# Patient Record
Sex: MALE | Race: OTHER | NOT HISPANIC OR LATINO | URBAN - METROPOLITAN AREA
[De-identification: names, ages, dates, MRNs, and addresses within clinical notes are randomized per-mention and may not be internally consistent; named-entity substitution may affect disease eponyms.]

---

## 2017-02-07 ENCOUNTER — OUTPATIENT (OUTPATIENT)
Dept: OUTPATIENT SERVICES | Facility: HOSPITAL | Age: 72
LOS: 1 days | Discharge: HOME | End: 2017-02-07

## 2017-06-27 DIAGNOSIS — R89.7 ABNORMAL HISTOLOGICAL FINDINGS IN SPECIMENS FROM OTHER ORGANS, SYSTEMS AND TISSUES: ICD-10-CM

## 2017-06-27 PROBLEM — Z00.00 ENCOUNTER FOR PREVENTIVE HEALTH EXAMINATION: Status: ACTIVE | Noted: 2017-06-27

## 2017-08-22 ENCOUNTER — APPOINTMENT (OUTPATIENT)
Dept: UROLOGY | Facility: CLINIC | Age: 72
End: 2017-08-22
Payer: MEDICARE

## 2017-08-22 VITALS — HEART RATE: 65 BPM | SYSTOLIC BLOOD PRESSURE: 134 MMHG | DIASTOLIC BLOOD PRESSURE: 84 MMHG

## 2017-08-22 DIAGNOSIS — Z78.9 OTHER SPECIFIED HEALTH STATUS: ICD-10-CM

## 2017-08-22 DIAGNOSIS — I10 ESSENTIAL (PRIMARY) HYPERTENSION: ICD-10-CM

## 2017-08-22 PROCEDURE — 99213 OFFICE O/P EST LOW 20 MIN: CPT

## 2017-08-22 PROCEDURE — 81002 URINALYSIS NONAUTO W/O SCOPE: CPT

## 2017-08-22 RX ORDER — LISINOPRIL 10 MG/1
10 TABLET ORAL
Refills: 0 | Status: ACTIVE | COMMUNITY

## 2017-08-22 RX ORDER — ATENOLOL 100 MG/1
100 TABLET ORAL
Refills: 0 | Status: ACTIVE | COMMUNITY

## 2018-04-19 ENCOUNTER — APPOINTMENT (OUTPATIENT)
Dept: UROLOGY | Facility: CLINIC | Age: 73
End: 2018-04-19
Payer: MEDICARE

## 2018-04-19 VITALS — DIASTOLIC BLOOD PRESSURE: 81 MMHG | SYSTOLIC BLOOD PRESSURE: 133 MMHG | HEART RATE: 72 BPM

## 2018-04-19 LAB
BILIRUB UR QL STRIP: NORMAL
CLARITY UR: CLEAR
COLLECTION METHOD: NORMAL
GLUCOSE UR-MCNC: NORMAL
HCG UR QL: NORMAL EU/DL
HGB UR QL STRIP.AUTO: NORMAL
KETONES UR-MCNC: NORMAL
LEUKOCYTE ESTERASE UR QL STRIP: 75
NITRITE UR QL STRIP: NORMAL
PH UR STRIP: 5
PROT UR STRIP-MCNC: NORMAL
SP GR UR STRIP: 1.02

## 2018-04-19 PROCEDURE — 81003 URINALYSIS AUTO W/O SCOPE: CPT | Mod: QW

## 2018-04-19 PROCEDURE — 99213 OFFICE O/P EST LOW 20 MIN: CPT

## 2018-07-28 PROBLEM — Z78.9 ALCOHOL USE: Status: ACTIVE | Noted: 2017-08-22

## 2018-11-13 ENCOUNTER — APPOINTMENT (OUTPATIENT)
Dept: UROLOGY | Facility: CLINIC | Age: 73
End: 2018-11-13
Payer: MEDICARE

## 2018-11-13 VITALS
WEIGHT: 157 LBS | HEIGHT: 65 IN | HEART RATE: 72 BPM | SYSTOLIC BLOOD PRESSURE: 117 MMHG | DIASTOLIC BLOOD PRESSURE: 75 MMHG | BODY MASS INDEX: 26.16 KG/M2

## 2018-11-13 LAB
BILIRUB UR QL STRIP: 1
CLARITY UR: CLEAR
COLLECTION METHOD: NORMAL
GLUCOSE UR-MCNC: NORMAL
HCG UR QL: 2 MG/DL
HGB UR QL STRIP.AUTO: NORMAL
KETONES UR-MCNC: NORMAL
LEUKOCYTE ESTERASE UR QL STRIP: NORMAL
NITRITE UR QL STRIP: NORMAL
PH UR STRIP: 5
PROT UR STRIP-MCNC: 100
SP GR UR STRIP: 1.02

## 2018-11-13 PROCEDURE — 81003 URINALYSIS AUTO W/O SCOPE: CPT | Mod: QW

## 2018-11-13 PROCEDURE — 99214 OFFICE O/P EST MOD 30 MIN: CPT

## 2019-05-01 ENCOUNTER — OTHER (OUTPATIENT)
Age: 74
End: 2019-05-01

## 2019-05-02 ENCOUNTER — APPOINTMENT (OUTPATIENT)
Dept: UROLOGY | Facility: CLINIC | Age: 74
End: 2019-05-02
Payer: MEDICARE

## 2019-05-02 VITALS
HEART RATE: 73 BPM | SYSTOLIC BLOOD PRESSURE: 140 MMHG | BODY MASS INDEX: 26.16 KG/M2 | HEIGHT: 65 IN | DIASTOLIC BLOOD PRESSURE: 85 MMHG | WEIGHT: 157 LBS

## 2019-05-02 DIAGNOSIS — N28.89 OTHER SPECIFIED DISORDERS OF KIDNEY AND URETER: ICD-10-CM

## 2019-05-02 LAB
BILIRUB UR QL STRIP: NORMAL
CLARITY UR: CLEAR
COLLECTION METHOD: NORMAL
GLUCOSE UR-MCNC: NORMAL
HCG UR QL: NORMAL EU/DL
HGB UR QL STRIP.AUTO: NORMAL
KETONES UR-MCNC: NORMAL
LEUKOCYTE ESTERASE UR QL STRIP: 25
NITRITE UR QL STRIP: NORMAL
PH UR STRIP: 5
PROT UR STRIP-MCNC: 30
SP GR UR STRIP: 1.01

## 2019-05-02 PROCEDURE — 99214 OFFICE O/P EST MOD 30 MIN: CPT

## 2019-05-02 PROCEDURE — 81003 URINALYSIS AUTO W/O SCOPE: CPT | Mod: QW

## 2019-12-17 ENCOUNTER — APPOINTMENT (OUTPATIENT)
Dept: UROLOGY | Facility: CLINIC | Age: 74
End: 2019-12-17
Payer: MEDICARE

## 2019-12-17 PROCEDURE — 93975 VASCULAR STUDY: CPT

## 2019-12-17 PROCEDURE — 76872 US TRANSRECTAL: CPT

## 2019-12-27 ENCOUNTER — MESSAGE (OUTPATIENT)
Age: 74
End: 2019-12-27

## 2020-09-03 ENCOUNTER — APPOINTMENT (OUTPATIENT)
Dept: UROLOGY | Facility: CLINIC | Age: 75
End: 2020-09-03
Payer: MEDICARE

## 2020-09-03 VITALS — TEMPERATURE: 97.7 F | WEIGHT: 157 LBS | BODY MASS INDEX: 26.16 KG/M2 | HEIGHT: 65 IN

## 2020-09-03 PROCEDURE — 99214 OFFICE O/P EST MOD 30 MIN: CPT

## 2020-09-03 RX ORDER — ALLOPURINOL 100 MG/1
100 TABLET ORAL
Refills: 0 | Status: ACTIVE | COMMUNITY

## 2020-09-03 RX ORDER — TAMSULOSIN HYDROCHLORIDE 0.4 MG/1
0.4 CAPSULE ORAL
Refills: 0 | Status: ACTIVE | COMMUNITY

## 2020-09-03 RX ORDER — FAMOTIDINE 10 MG/1
TABLET, FILM COATED ORAL
Refills: 0 | Status: ACTIVE | COMMUNITY

## 2020-09-22 RX ORDER — FINASTERIDE 5 MG/1
5 TABLET, FILM COATED ORAL DAILY
Qty: 90 | Refills: 3 | Status: ACTIVE | COMMUNITY
Start: 2020-09-22 | End: 1900-01-01

## 2020-12-29 ENCOUNTER — APPOINTMENT (OUTPATIENT)
Dept: UROLOGY | Facility: CLINIC | Age: 75
End: 2020-12-29
Payer: MEDICARE

## 2020-12-29 VITALS
DIASTOLIC BLOOD PRESSURE: 77 MMHG | WEIGHT: 165 LBS | BODY MASS INDEX: 27.49 KG/M2 | HEIGHT: 65 IN | TEMPERATURE: 98.2 F | HEART RATE: 81 BPM | SYSTOLIC BLOOD PRESSURE: 134 MMHG

## 2020-12-29 PROCEDURE — 99213 OFFICE O/P EST LOW 20 MIN: CPT

## 2020-12-29 PROCEDURE — 99072 ADDL SUPL MATRL&STAF TM PHE: CPT

## 2021-03-16 ENCOUNTER — APPOINTMENT (OUTPATIENT)
Dept: UROLOGY | Facility: CLINIC | Age: 76
End: 2021-03-16
Payer: MEDICARE

## 2021-03-16 PROCEDURE — 99448 NTRPROF PH1/NTRNET/EHR 21-30: CPT

## 2021-03-16 RX ORDER — DUTASTERIDE 0.5 MG/1
0.5 CAPSULE, LIQUID FILLED ORAL
Qty: 90 | Refills: 3 | Status: DISCONTINUED | COMMUNITY
Start: 2020-09-03 | End: 2021-03-16

## 2021-03-16 NOTE — HISTORY OF PRESENT ILLNESS
[Home] : at home, [unfilled] , at the time of the visit. [Verbal consent obtained from patient] : the patient, [unfilled] [Nocturia] : nocturia [Straining] : straining [Post-Void Dribbling] : post-void dribbling [None] : None [Other Location: e.g. Home (Enter Location, City,State)___] : at [unfilled] [FreeTextEntry1] : 75 y.o male with h/o BPH(158gms), elevated PSA \par s/p PNBX x 2  last 2/2017\par Now on flomax hs in stead of in am  w/  finasteride  x 3 months \par \par All Past and Present Data Reviewed:\par PSA  2/2021- 6.18  *****  on Finasteride    4K score = 6%\par          8/2020-  12.6     PSAD- 0.07                     TRUS- 12/2019- 158gms, no susp areas\par         12/2019- 13.0                                         abd/pelvic ctscan with/without contrast 5/2019- bilateral renal\par         4/2019  - 12.8     PSAD = .10                                                                             cysts, none   suspicious        psa         11/2018  -12.5     PSAD =  .10 \par         4/2018-  10.03    PSAD  0.08\par         8/2017-  7.0    %free 20\par \par 4 k score - 2016- 14%\par \par lives in [place]\par cannot tolerate MRI due to claustrophobia\par  [Urinary Urgency] : no urinary urgency [Urinary Frequency] : no urinary frequency [Weak Stream] : no weak stream [Dysuria] : no dysuria [Hematuria - Gross] : no gross hematuria [Fever] : no fever

## 2021-03-16 NOTE — ASSESSMENT
[FreeTextEntry1] : 1.  BPH(158gms),\par 2.  elevated PSA  *** on finateride\par      s/p PNBX x 2  last 2/2017\par 3. bilateral  renal cysts, benign\par \par \par -Plan : \par -repeat PSA August -  pt will be in [place].\par -rtn  August

## 2021-03-16 NOTE — PHYSICAL EXAM
[General Appearance - Well Developed] : well developed [General Appearance - Well Nourished] : well nourished [Normal Appearance] : normal appearance [Well Groomed] : well groomed [General Appearance - In No Acute Distress] : no acute distress [Abdomen Soft] : soft [Abdomen Tenderness] : non-tender [Costovertebral Angle Tenderness] : no ~M costovertebral angle tenderness [Skin Color & Pigmentation] : normal skin color and pigmentation [Edema] : no peripheral edema [] : no respiratory distress [Respiration, Rhythm And Depth] : normal respiratory rhythm and effort [Exaggerated Use Of Accessory Muscles For Inspiration] : no accessory muscle use [Oriented To Time, Place, And Person] : oriented to person, place, and time [Affect] : the affect was normal [Mood] : the mood was normal [Not Anxious] : not anxious [Normal Station and Gait] : the gait and station were normal for the patient's age [No Focal Deficits] : no focal deficits [Motor Exam] : the motor exam was normal [No Palpable Adenopathy] : no palpable adenopathy

## 2021-06-21 ENCOUNTER — NON-APPOINTMENT (OUTPATIENT)
Age: 76
End: 2021-06-21

## 2021-08-19 ENCOUNTER — APPOINTMENT (OUTPATIENT)
Dept: UROLOGY | Facility: CLINIC | Age: 76
End: 2021-08-19
Payer: MEDICARE

## 2021-08-19 VITALS — HEIGHT: 65 IN | WEIGHT: 165 LBS | BODY MASS INDEX: 27.49 KG/M2

## 2021-08-19 LAB
BILIRUB UR QL STRIP: NORMAL
COLLECTION METHOD: NORMAL
GLUCOSE UR-MCNC: NORMAL
HCG UR QL: 0.2 EU/DL
HGB UR QL STRIP.AUTO: NORMAL
KETONES UR-MCNC: NORMAL
LEUKOCYTE ESTERASE UR QL STRIP: NORMAL
NITRITE UR QL STRIP: NORMAL
PH UR STRIP: 5.5
PROT UR STRIP-MCNC: 100
SP GR UR STRIP: 1.02

## 2021-08-19 PROCEDURE — 99213 OFFICE O/P EST LOW 20 MIN: CPT

## 2021-08-19 NOTE — HISTORY OF PRESENT ILLNESS
[Nocturia] : nocturia [Straining] : straining [Post-Void Dribbling] : post-void dribbling [None] : None [Home] : at home, [unfilled] , at the time of the visit. [Other Location: e.g. Home (Enter Location, City,State)___] : at [unfilled] [Verbal consent obtained from patient] : the patient, [unfilled] [FreeTextEntry1] : [age] y.o male with h/o BPH(158gms), elevated PSA \par s/p PNBX x 2  last 2/2017\par Now on flomax hs in stead of in am  w/  finasteride  x 3 months \par \par All Past and Present Data Reviewed:\par psa   8/21  =  4.4   % fpsa = 14  ******* finasteride \par PSA  2/2021- 6.18  *****  on Finasteride    4K score = 6%\par          8/2020-  12.6     PSAD- 0.07                     TRUS- 12/2019- 158gms, no susp areas\par         12/2019- 13.0                                         abd/pelvic ctscan with/without contrast 5/2019- bilateral renal\par         4/2019  - 12.8     PSAD = .10                                                                             cysts, none   suspicious                     11/2018  -12.5     PSAD =  .10 \par         4/2018-  10.03    PSAD  0.08\par         8/2017-  7.0    %free 20\par \par 4 k score - 2016- 14%\par \par lives in [place]\par cannot tolerate MRI due to claustrophobia\par  [Urinary Urgency] : no urinary urgency [Urinary Frequency] : no urinary frequency [Weak Stream] : no weak stream [Dysuria] : no dysuria [Hematuria - Gross] : no gross hematuria [Fever] : no fever

## 2021-08-19 NOTE — ASSESSMENT
[FreeTextEntry1] : 1.  BPH(158gms),\par 2.  elevated PSA  *** on finateride\par      s/p PNBX x 2  last 2/2017\par 3. bilateral  renal cysts, benign\par \par \par -Plan : \par -repeat PSA December ==  pt goimg to [place] 2022

## 2021-08-19 NOTE — PHYSICAL EXAM
[General Appearance - Well Developed] : well developed [General Appearance - Well Nourished] : well nourished [Normal Appearance] : normal appearance [Well Groomed] : well groomed [General Appearance - In No Acute Distress] : no acute distress [Abdomen Soft] : soft [Costovertebral Angle Tenderness] : no ~M costovertebral angle tenderness [Abdomen Tenderness] : non-tender [Skin Color & Pigmentation] : normal skin color and pigmentation [Edema] : no peripheral edema [] : no respiratory distress [Respiration, Rhythm And Depth] : normal respiratory rhythm and effort [Exaggerated Use Of Accessory Muscles For Inspiration] : no accessory muscle use [Oriented To Time, Place, And Person] : oriented to person, place, and time [Affect] : the affect was normal [Mood] : the mood was normal [Not Anxious] : not anxious [Normal Station and Gait] : the gait and station were normal for the patient's age [No Focal Deficits] : no focal deficits [Motor Exam] : the motor exam was normal [No Palpable Adenopathy] : no palpable adenopathy

## 2022-03-16 ENCOUNTER — RX RENEWAL (OUTPATIENT)
Age: 77
End: 2022-03-16

## 2022-11-22 ENCOUNTER — APPOINTMENT (OUTPATIENT)
Dept: UROLOGY | Facility: CLINIC | Age: 77
End: 2022-11-22

## 2022-11-22 VITALS
DIASTOLIC BLOOD PRESSURE: 70 MMHG | HEIGHT: 65 IN | BODY MASS INDEX: 25.83 KG/M2 | SYSTOLIC BLOOD PRESSURE: 130 MMHG | WEIGHT: 155 LBS

## 2022-11-22 DIAGNOSIS — N52.9 MALE ERECTILE DYSFUNCTION, UNSPECIFIED: ICD-10-CM

## 2022-11-22 PROCEDURE — 99214 OFFICE O/P EST MOD 30 MIN: CPT

## 2022-11-22 RX ORDER — TADALAFIL 20 MG/1
20 TABLET ORAL
Qty: 30 | Refills: 3 | Status: ACTIVE | COMMUNITY
Start: 2022-11-22 | End: 1900-01-01

## 2023-03-24 ENCOUNTER — RX RENEWAL (OUTPATIENT)
Age: 78
End: 2023-03-24

## 2023-03-24 RX ORDER — FINASTERIDE 5 MG/1
5 TABLET, FILM COATED ORAL
Qty: 90 | Refills: 3 | Status: ACTIVE | COMMUNITY
Start: 2021-03-31 | End: 1900-01-01

## 2023-11-21 ENCOUNTER — APPOINTMENT (OUTPATIENT)
Dept: UROLOGY | Facility: CLINIC | Age: 78
End: 2023-11-21

## 2023-12-13 ENCOUNTER — APPOINTMENT (OUTPATIENT)
Dept: UROLOGY | Facility: CLINIC | Age: 78
End: 2023-12-13
Payer: MEDICARE

## 2023-12-13 PROCEDURE — 99214 OFFICE O/P EST MOD 30 MIN: CPT

## 2023-12-13 NOTE — PHYSICAL EXAM
[General Appearance - Well Developed] : well developed [General Appearance - Well Nourished] : well nourished [Normal Appearance] : normal appearance [Well Groomed] : well groomed [General Appearance - In No Acute Distress] : no acute distress [Abdomen Soft] : soft [Abdomen Tenderness] : non-tender [Costovertebral Angle Tenderness] : no ~M costovertebral angle tenderness [Skin Color & Pigmentation] : normal skin color and pigmentation [] : no respiratory distress [Respiration, Rhythm And Depth] : normal respiratory rhythm and effort [Exaggerated Use Of Accessory Muscles For Inspiration] : no accessory muscle use [Oriented To Time, Place, And Person] : oriented to person, place, and time [Affect] : the affect was normal [Mood] : the mood was normal [Not Anxious] : not anxious [Normal Station and Gait] : the gait and station were normal for the patient's age [No Focal Deficits] : no focal deficits [Motor Exam] : the motor exam was normal

## 2023-12-13 NOTE — ASSESSMENT
[FreeTextEntry1] : 1. BPH(158gms) 2. elevated PSA - s/p PNBX x 2 last 2/2017- decreasing  3. worsening LUTS on finasteride and flomax   Plan: -advise a repeat TRUS to re-size prostate in consideration for a prostate procedure- pt reports he is leaving January 2024 to go to [place] for 6 months -we can switch Flomax to Silodosin 8mg to see if this helps LUTS -continue finasteride and pt advised to be consistent with taking med everyday -repeat PSA in 6 months -rto 6 months -pt advised if LUTS do not improve he should consider seeing a urologist in [place] for management  total time spent with encounter including face to face time with patient and review of chart and plan totaled 30 minutes

## 2023-12-13 NOTE — HISTORY OF PRESENT ILLNESS
[Nocturia] : nocturia [Straining] : straining [Post-Void Dribbling] : post-void dribbling [None] : None [FreeTextEntry1] : 78 year old male with h/o BPH (158 gm), LUTS and elevated PSA  s/p PNBX x 2  last 02/2017.  Patient is maintained on tamsulosin 0.4 MG and Finasteride 5 MG.  He reports worsening LUTS specifically daytime frequently, urgency, nocturia 3-4x, post void dribbling. than he would like, but less than he would prior to the medication.  He denies dysuria, hematuria, fever, nausea, or other constitutional symptoms.   Patient cannot have mpMRI due to claustrophobia Patient now reports having stopped the finasteride for a while when he had his PSA drawn last year  All past and present data reviewed: 11/2023  PSA=  2.3  %free 17   ***on finasteride 11/2022 PSA= 3.8  %free= 21 *** finasteride *** pt now reports he wasn't taking the finasteride 08/2022 PSA= 4.4  %free= 20 *** finasteride 02/2022 PSA= 3.2  *** on finasteride  08/2021 PSA= 4.4   % fpsa = 14  ******* finasteride  02/2021 PSA= 6.18  *****  on Finasteride    4K score = 6% 08/2020 PSA= 12.6     PSAD- 0.07                     TRUS- 12/2019- 158gms, no susp areas 12/2019 PSA= 13.0                                         abd/pelvic ctscan with/without contrast 5/2019- bilateral renal 04/2019 PSA= 12.8     PSAD = .10                                                                             cysts, none   suspicious              11/2018 PSA= 12.5     PSAD =  .10  04/2018 PSA= 10.03    PSAD  0.08 08/2017 PSA= 7.0    %free 20 4K score - 2016- 14% [Urinary Urgency] : no urinary urgency [Urinary Frequency] : no urinary frequency [Weak Stream] : no weak stream [Dysuria] : no dysuria [Hematuria - Gross] : no gross hematuria [Fever] : no fever [Nausea] : no nausea

## 2024-03-12 ENCOUNTER — NON-APPOINTMENT (OUTPATIENT)
Age: 79
End: 2024-03-12

## 2024-03-12 RX ORDER — SILODOSIN 8 MG/1
8 CAPSULE ORAL
Qty: 90 | Refills: 3 | Status: ACTIVE | COMMUNITY
Start: 2023-12-13 | End: 1900-01-01

## 2024-05-07 ENCOUNTER — APPOINTMENT (OUTPATIENT)
Dept: UROLOGY | Facility: CLINIC | Age: 79
End: 2024-05-07
Payer: MEDICARE

## 2024-05-07 VITALS
OXYGEN SATURATION: 98 % | SYSTOLIC BLOOD PRESSURE: 128 MMHG | DIASTOLIC BLOOD PRESSURE: 72 MMHG | TEMPERATURE: 97.9 F | RESPIRATION RATE: 16 BRPM | HEART RATE: 78 BPM

## 2024-05-07 VITALS — WEIGHT: 169 LBS | HEIGHT: 65 IN | BODY MASS INDEX: 28.16 KG/M2

## 2024-05-07 DIAGNOSIS — R35.1 NOCTURIA: ICD-10-CM

## 2024-05-07 LAB
BILIRUB UR QL STRIP: NEGATIVE
GLUCOSE UR-MCNC: NEGATIVE
HCG UR QL: 2 EU/DL
HGB UR QL STRIP.AUTO: NORMAL
KETONES UR-MCNC: NEGATIVE
LEUKOCYTE ESTERASE UR QL STRIP: NEGATIVE
NITRITE UR QL STRIP: NEGATIVE
PH UR STRIP: 6
PROT UR STRIP-MCNC: 30
SP GR UR STRIP: 1.02

## 2024-05-07 PROCEDURE — 51798 US URINE CAPACITY MEASURE: CPT

## 2024-05-07 PROCEDURE — G2211 COMPLEX E/M VISIT ADD ON: CPT

## 2024-05-07 PROCEDURE — 99214 OFFICE O/P EST MOD 30 MIN: CPT | Mod: 25

## 2024-05-07 PROCEDURE — 81003 URINALYSIS AUTO W/O SCOPE: CPT | Mod: QW

## 2024-05-07 RX ORDER — OXYBUTYNIN CHLORIDE 5 MG/1
5 TABLET ORAL
Qty: 30 | Refills: 5 | Status: ACTIVE | COMMUNITY
Start: 2024-05-07 | End: 1900-01-01

## 2024-05-22 ENCOUNTER — APPOINTMENT (OUTPATIENT)
Dept: UROLOGY | Facility: CLINIC | Age: 79
End: 2024-05-22
Payer: MEDICARE

## 2024-05-22 PROCEDURE — 76872 US TRANSRECTAL: CPT

## 2024-05-30 ENCOUNTER — RESULT CHARGE (OUTPATIENT)
Age: 79
End: 2024-05-30

## 2024-05-30 ENCOUNTER — APPOINTMENT (OUTPATIENT)
Dept: UROLOGY | Facility: CLINIC | Age: 79
End: 2024-05-30
Payer: MEDICARE

## 2024-05-30 VITALS
HEIGHT: 65 IN | DIASTOLIC BLOOD PRESSURE: 89 MMHG | TEMPERATURE: 98 F | BODY MASS INDEX: 28.16 KG/M2 | WEIGHT: 169 LBS | SYSTOLIC BLOOD PRESSURE: 163 MMHG | OXYGEN SATURATION: 99 % | HEART RATE: 74 BPM

## 2024-05-30 DIAGNOSIS — R39.9 UNSPECIFIED SYMPTOMS AND SIGNS INVOLVING THE GENITOURINARY SYSTEM: ICD-10-CM

## 2024-05-30 DIAGNOSIS — N13.8 BENIGN PROSTATIC HYPERPLASIA WITH LOWER URINARY TRACT SYMPMS: ICD-10-CM

## 2024-05-30 DIAGNOSIS — R97.20 ELEVATED PROSTATE, SPECIFIC ANTIGEN [PSA]: ICD-10-CM

## 2024-05-30 DIAGNOSIS — N40.1 BENIGN PROSTATIC HYPERPLASIA WITH LOWER URINARY TRACT SYMPMS: ICD-10-CM

## 2024-05-30 DIAGNOSIS — N28.1 CYST OF KIDNEY, ACQUIRED: ICD-10-CM

## 2024-05-30 LAB
BILIRUB UR QL STRIP: NORMAL
COLLECTION METHOD: NORMAL
GLUCOSE UR-MCNC: NORMAL
HCG UR QL: 0.2 EU/DL
HGB UR QL STRIP.AUTO: NORMAL
KETONES UR-MCNC: NORMAL
LEUKOCYTE ESTERASE UR QL STRIP: NORMAL
NITRITE UR QL STRIP: NORMAL
PH UR STRIP: 6
PROT UR STRIP-MCNC: NORMAL
SP GR UR STRIP: 1.02

## 2024-05-30 PROCEDURE — 99214 OFFICE O/P EST MOD 30 MIN: CPT

## 2024-05-30 PROCEDURE — G2211 COMPLEX E/M VISIT ADD ON: CPT

## 2024-05-30 RX ORDER — MIRABEGRON 25 MG/1
25 TABLET, EXTENDED RELEASE ORAL
Qty: 30 | Refills: 5 | Status: ACTIVE | COMMUNITY
Start: 2024-05-30 | End: 1900-01-01

## 2024-08-21 ENCOUNTER — APPOINTMENT (OUTPATIENT)
Dept: UROLOGY | Facility: CLINIC | Age: 79
End: 2024-08-21

## 2024-08-21 VITALS
WEIGHT: 169 LBS | OXYGEN SATURATION: 95 % | SYSTOLIC BLOOD PRESSURE: 144 MMHG | HEART RATE: 74 BPM | TEMPERATURE: 98 F | HEIGHT: 65 IN | RESPIRATION RATE: 17 BRPM | BODY MASS INDEX: 28.16 KG/M2 | DIASTOLIC BLOOD PRESSURE: 84 MMHG

## 2024-08-21 DIAGNOSIS — R97.20 ELEVATED PROSTATE, SPECIFIC ANTIGEN [PSA]: ICD-10-CM

## 2024-08-21 DIAGNOSIS — R39.9 UNSPECIFIED SYMPTOMS AND SIGNS INVOLVING THE GENITOURINARY SYSTEM: ICD-10-CM

## 2024-08-21 DIAGNOSIS — N40.1 BENIGN PROSTATIC HYPERPLASIA WITH LOWER URINARY TRACT SYMPMS: ICD-10-CM

## 2024-08-21 DIAGNOSIS — R35.1 NOCTURIA: ICD-10-CM

## 2024-08-21 DIAGNOSIS — N13.8 BENIGN PROSTATIC HYPERPLASIA WITH LOWER URINARY TRACT SYMPMS: ICD-10-CM

## 2024-08-21 DIAGNOSIS — N28.1 CYST OF KIDNEY, ACQUIRED: ICD-10-CM

## 2024-08-21 PROCEDURE — 51798 US URINE CAPACITY MEASURE: CPT

## 2024-08-21 NOTE — END OF VISIT
[Time Spent: ___ minutes] : I have spent [unfilled] minutes of time on the encounter which excludes teaching and separately reported services. [4. Prevent psychological harm to patient or others] : Reason - Prevent psychological harm to patient or others [FreeTextEntry3] : Patient notes was transcribed by chayo Newman  under the supervision of Dr. Yanez. And I have   reviewed the patient's chart and agree that it aligns   with  my medical decisions.

## 2024-08-21 NOTE — HISTORY OF PRESENT ILLNESS
[Nocturia] : nocturia [Straining] : straining [Post-Void Dribbling] : post-void dribbling [None] : None [Urinary Urgency] : urinary urgency [FreeTextEntry1] : 79 year old male with h/o BPH (158 gm), LUTS and elevated PSA. Here for bladder scan. He d/c Myrbetriq 25 MG as he was hallucinating. Patient is maintained on silodosin 8 MG and Finasteride 5 MG. Patient is not active. He denies dysuria, hematuria, fever, nausea, or other constitutional symptoms. The patient is accompanied by son . Patient cannot have mpMRI due to claustrophobia. He reports worsening LUTS, complains of nocturia 5-6x. Patient reports worsening urgency. Symptoms mostly unchanged since last visit. Patient doesn't remember what alpha blocker he's taking.   All past and present data reviewed: 05/2024 TRUS 104 grams, questionable 5 mm isoechoic focus with restrictive flow on Doppler  08/2024 bladder scan = 5cc 05/2024 bladder scan (voided an hour ago) = >76 cc  04/2024 PSA= 2.6    *** on finasteride 11/2023  PSA=  2.3  %free 17   ***on finasteride 11/2022 PSA= 3.8  %free= 21 *** finasteride *** pt now reports he wasn't taking the finasteride 08/2022 PSA= 4.4  %free= 20 *** finasteride 02/2022 PSA= 3.2  *** on finasteride  08/2021 PSA= 4.4   % fpsa = 14  ******* finasteride  02/2021 PSA= 6.18  *****  on Finasteride    4K score = 6% 08/2020 PSA= 12.6     PSAD- 0.07                     TRUS- 12/2019- 158gms, no susp areas 12/2019 PSA= 13.0                                         abd/pelvic ctscan with/without contrast 5/2019- bilateral renal 04/2019 PSA= 12.8     PSAD = .10                                                                             cysts, none   suspicious              11/2018 PSA= 12.5     PSAD =  .10  04/2018 PSA= 10.03    PSAD  0.08 08/2017 PSA= 7.0    %free 20 4K score - 2016- 14%  05/2024 UA= neg [Urinary Frequency] : no urinary frequency [Weak Stream] : no weak stream [Dysuria] : no dysuria [Hematuria - Gross] : no gross hematuria

## 2024-08-21 NOTE — PHYSICAL EXAM
[Normal Appearance] : normal appearance [Well Groomed] : well groomed [General Appearance - In No Acute Distress] : no acute distress [Edema] : no peripheral edema [Respiration, Rhythm And Depth] : normal respiratory rhythm and effort [Exaggerated Use Of Accessory Muscles For Inspiration] : no accessory muscle use [Abdomen Soft] : soft [Abdomen Tenderness] : non-tender [Costovertebral Angle Tenderness] : no ~M costovertebral angle tenderness [Normal Station and Gait] : the gait and station were normal for the patient's age [Skin Color & Pigmentation] : normal skin color and pigmentation [] : no rash [No Focal Deficits] : no focal deficits [Oriented To Time, Place, And Person] : oriented to person, place, and time [Affect] : the affect was normal [Mood] : the mood was normal [Not Anxious] : not anxious [No Palpable Adenopathy] : no palpable adenopathy [de-identified] : 05/2024 bladder scan (voided an hour ago) = >76 cc

## 2024-08-21 NOTE — PHYSICAL EXAM
[Normal Appearance] : normal appearance [Well Groomed] : well groomed [General Appearance - In No Acute Distress] : no acute distress [Edema] : no peripheral edema [Respiration, Rhythm And Depth] : normal respiratory rhythm and effort [Exaggerated Use Of Accessory Muscles For Inspiration] : no accessory muscle use [Abdomen Soft] : soft [Abdomen Tenderness] : non-tender [Costovertebral Angle Tenderness] : no ~M costovertebral angle tenderness [Normal Station and Gait] : the gait and station were normal for the patient's age [Skin Color & Pigmentation] : normal skin color and pigmentation [] : no rash [No Focal Deficits] : no focal deficits [Oriented To Time, Place, And Person] : oriented to person, place, and time [Affect] : the affect was normal [Mood] : the mood was normal [Not Anxious] : not anxious [No Palpable Adenopathy] : no palpable adenopathy [de-identified] : 05/2024 bladder scan (voided an hour ago) = >76 cc

## 2024-08-21 NOTE — ASSESSMENT
[FreeTextEntry1] : 1. BPH (158gms on 2018 TRUS) 2. elevated PSA - s/p PNBX x 2 last 2/2017- improving 3. 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 4. questionable 5 mm isoechoic focus with restrictive flow on Doppler on TRUS-- 05/2024.  Patient not interested in pursuing this.  Plan: -Discussed SDM for repeating TRUS in the future as patient is claustrophobic. We also discussed him undergoing an mpMRI while on Valium. Another possibility would be to undergo an mpMRI under general anesthesia at [facility]. Patient understands this option would require him to bring disc from [facility]. Another option would be to proceed with a systematic stereotactic US-guided TP prostate biopsy. We discussed what this latter option involves and entails. Patient prefers to repeat TRUS in 6 months with a repeat PSA. He understands risk of delaying diagnosis of prostate cancer and understands that an mpMRI is more accurate than a TRUS in evaluating presence. -Discussed bladder scan results.  -Discussed how finasteride reduces the size of the prostate and how it effects PSA value -Advised patient to continue following current dietary habits.  -Discussed trial of discontinuing the use of alpha-blocker but maintaining finasteride as a way of controlling his voiding symptoms. -TRUS in office 3 months -PSA 3 months -RTO in 6 weeks for bladder scan.  -Advised patient to call [facility] to see which prostate medication the patient is taking.  -Plan to d/c alpha blocker once we know the name of the medication.  -Patient has no other additional questions

## 2024-08-21 NOTE — ASSESSMENT
[FreeTextEntry1] : 1. BPH (158gms on 2018 TRUS) 2. elevated PSA - s/p PNBX x 2 last 2/2017- improving 3. 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 4. questionable 5 mm isoechoic focus with restrictive flow on Doppler on TRUS-- 05/2024.  Patient not interested in pursuing this.  Plan: -Discussed SDM for repeating TRUS in the future as patient is claustrophobic. We also discussed him undergoing an mpMRI while on Valium. Another possibility would be to undergo an mpMRI under general anesthesia at [facility]. Patient understands this option would require him to bring disc from [facility]. Another option would be to proceed with a systematic stereotactic US-guided TP prostate biopsy. We discussed what this latter option involves and entails. Patient prefers to repeat TRUS in 6 months with a repeat PSA. He understands risk of delaying diagnosis of prostate cancer and understands that an mpMRI is more accurate than a TRUS in evaluating presence. -Discussed bladder scan results.  -Discussed how finasteride reduces the size of the prostate and how it effects PSA value -Advised patient to continue following current dietary habits.  -Discussed trial of discontinuing the use of alpha-blocker but maintaining finasteride as a way of controlling his voiding symptoms. -TRUS in office 3 months -PSA 3 months -RTO in 6 weeks for bladder scan.  -Advised patient to call [facility] to see which prostate medication the patient is taking.  -Plan to d/c alpha blocker once we know the name of the medication.  -Patient has no other additional questions

## 2024-09-04 RX ORDER — TROSPIUM CHLORIDE 20 MG/1
20 TABLET, FILM COATED ORAL
Qty: 60 | Refills: 5 | Status: ACTIVE | COMMUNITY
Start: 2024-09-04 | End: 1900-01-01

## 2024-09-09 ENCOUNTER — NON-APPOINTMENT (OUTPATIENT)
Age: 79
End: 2024-09-09

## 2024-09-10 RX ORDER — TAMSULOSIN HYDROCHLORIDE 0.4 MG/1
0.4 CAPSULE ORAL
Qty: 30 | Refills: 5 | Status: ACTIVE | COMMUNITY
Start: 2024-09-10 | End: 1900-01-01

## 2024-09-12 ENCOUNTER — APPOINTMENT (OUTPATIENT)
Dept: UROLOGY | Facility: CLINIC | Age: 79
End: 2024-09-12
Payer: MEDICARE

## 2024-09-12 PROCEDURE — G2211 COMPLEX E/M VISIT ADD ON: CPT

## 2024-09-12 PROCEDURE — 99214 OFFICE O/P EST MOD 30 MIN: CPT

## 2024-09-12 NOTE — HISTORY OF PRESENT ILLNESS
[Urinary Urgency] : urinary urgency [Nocturia] : nocturia [Straining] : straining [Post-Void Dribbling] : post-void dribbling [None] : None [FreeTextEntry1] : 79 year old male with h/o BPH (104 gm), LUTS and elevated PSA.  patient here for f/u after an ER visit for AUR after beginning tropsium 20mg for urinary frequency he states he took the med for a few days and thought it was helping him until he got up to void one night and could not.  He was started on Flomax 0.4mg and has been maintained on Finasteride 5mg denies constipation  Previous history: Pt previously d/c'ed Myrbetriq 25 MG as he was hallucinating. Patient is maintained on Finasteride 5 MG.  Patient cannot have mpMRI due to claustrophobia.   All past and present data reviewed: 05/2024 TRUS 104 grams, questionable 5 mm isoechoic focus with restrictive flow on Doppler  08/2024 bladder scan = 5cc 05/2024 bladder scan (voided an hour ago) = >76 cc  04/2024 PSA= 2.6    *** on finasteride 11/2023  PSA=  2.3  %free 17   ***on finasteride 11/2022 PSA= 3.8  %free= 21 *** finasteride *** pt now reports he wasn't taking the finasteride 08/2022 PSA= 4.4  %free= 20 *** finasteride 02/2022 PSA= 3.2  *** on finasteride  08/2021 PSA= 4.4   % fpsa = 14  ******* finasteride  02/2021 PSA= 6.18  *****  on Finasteride    4K score = 6% 08/2020 PSA= 12.6     PSAD- 0.07                     TRUS- 12/2019- 158gms, no susp areas 12/2019 PSA= 13.0                                         abd/pelvic ctscan with/without contrast 5/2019- bilateral renal 04/2019 PSA= 12.8     PSAD = .10                                                                             cysts, none   suspicious              11/2018 PSA= 12.5     PSAD =  .10  04/2018 PSA= 10.03    PSAD  0.08 08/2017 PSA= 7.0    %free 20 4K score - 2016- 14%  05/2024 UA= neg [Urinary Frequency] : no urinary frequency [Weak Stream] : no weak stream [Dysuria] : no dysuria [Hematuria - Gross] : no gross hematuria

## 2024-09-12 NOTE — PHYSICAL EXAM
[Normal Appearance] : normal appearance [Well Groomed] : well groomed [General Appearance - In No Acute Distress] : no acute distress [Abdomen Soft] : soft [Abdomen Tenderness] : non-tender [Costovertebral Angle Tenderness] : no ~M costovertebral angle tenderness [Normal Station and Gait] : the gait and station were normal for the patient's age [Skin Color & Pigmentation] : normal skin color and pigmentation [] : no rash [No Focal Deficits] : no focal deficits [Oriented To Time, Place, And Person] : oriented to person, place, and time [Affect] : the affect was normal [Mood] : the mood was normal [Not Anxious] : not anxious [No Palpable Adenopathy] : no palpable adenopathy [de-identified] : whyte catheter to leg bag with clear urine- removed without difficulty- pt tolerated well

## 2024-09-12 NOTE — ASSESSMENT
[FreeTextEntry1] : 1. BPH (104gms on 2024 TRUS) 2. elevated PSA - s/p PNBX x 2 last 2/2017 3. AUR after beginning Tropsium 20mg 4. questionable 5 mm isoechoic focus with restrictive flow on Doppler on TRUS-- 05/2024.  Patient not interested in pursuing this.  Plan: -increase liquid intake, if no u/o in 6-8 hrs pt to call office or go the ER -advised pt that he should no longer take any bladder medication -continue flomax 0.4mg and finasteride 5mg -pt has a f/u in November as he will be going to [place] this week and not retuning until that time -discussed urodynamic testing with patient and prostate reducing surgery  total time spent with encounter including face to face time with patient and review of chart and plan totaled 30 minutes

## 2024-11-20 ENCOUNTER — APPOINTMENT (OUTPATIENT)
Dept: UROLOGY | Facility: CLINIC | Age: 79
End: 2024-11-20

## 2024-11-26 ENCOUNTER — NON-APPOINTMENT (OUTPATIENT)
Age: 79
End: 2024-11-26

## 2024-12-19 ENCOUNTER — APPOINTMENT (OUTPATIENT)
Dept: UROLOGY | Facility: CLINIC | Age: 79
End: 2024-12-19
Payer: MEDICARE

## 2024-12-19 DIAGNOSIS — N28.1 CYST OF KIDNEY, ACQUIRED: ICD-10-CM

## 2024-12-19 DIAGNOSIS — N32.81 OVERACTIVE BLADDER: ICD-10-CM

## 2024-12-19 DIAGNOSIS — N13.8 BENIGN PROSTATIC HYPERPLASIA WITH LOWER URINARY TRACT SYMPMS: ICD-10-CM

## 2024-12-19 DIAGNOSIS — R97.20 ELEVATED PROSTATE, SPECIFIC ANTIGEN [PSA]: ICD-10-CM

## 2024-12-19 DIAGNOSIS — N40.1 BENIGN PROSTATIC HYPERPLASIA WITH LOWER URINARY TRACT SYMPMS: ICD-10-CM

## 2024-12-19 DIAGNOSIS — R35.1 NOCTURIA: ICD-10-CM

## 2024-12-19 DIAGNOSIS — R39.9 UNSPECIFIED SYMPTOMS AND SIGNS INVOLVING THE GENITOURINARY SYSTEM: ICD-10-CM

## 2024-12-19 PROCEDURE — 51798 US URINE CAPACITY MEASURE: CPT

## 2024-12-19 PROCEDURE — G2211 COMPLEX E/M VISIT ADD ON: CPT

## 2024-12-19 PROCEDURE — 99214 OFFICE O/P EST MOD 30 MIN: CPT | Mod: 25

## 2024-12-25 PROBLEM — F10.90 ALCOHOL USE: Status: ACTIVE | Noted: 2017-08-22

## 2025-03-07 ENCOUNTER — RX RENEWAL (OUTPATIENT)
Age: 80
End: 2025-03-07

## 2025-05-07 ENCOUNTER — RX RENEWAL (OUTPATIENT)
Age: 80
End: 2025-05-07

## 2025-07-18 ENCOUNTER — APPOINTMENT (OUTPATIENT)
Dept: UROLOGY | Facility: CLINIC | Age: 80
End: 2025-07-18
Payer: MEDICARE

## 2025-07-18 VITALS
HEIGHT: 65 IN | OXYGEN SATURATION: 96 % | SYSTOLIC BLOOD PRESSURE: 151 MMHG | HEART RATE: 62 BPM | DIASTOLIC BLOOD PRESSURE: 83 MMHG | RESPIRATION RATE: 18 BRPM | BODY MASS INDEX: 27.49 KG/M2 | WEIGHT: 165 LBS

## 2025-07-18 LAB
BILIRUB UR QL STRIP: NORMAL
COLLECTION METHOD: NORMAL
GLUCOSE UR-MCNC: NORMAL
HCG UR QL: 0.2 EU/DL
HGB UR QL STRIP.AUTO: NORMAL
KETONES UR-MCNC: NORMAL
LEUKOCYTE ESTERASE UR QL STRIP: NORMAL
NITRITE UR QL STRIP: NORMAL
PH UR STRIP: 5.5
PROT UR STRIP-MCNC: 30
SP GR UR STRIP: 1.02

## 2025-07-18 PROCEDURE — 99214 OFFICE O/P EST MOD 30 MIN: CPT | Mod: 25

## 2025-07-18 PROCEDURE — 81003 URINALYSIS AUTO W/O SCOPE: CPT | Mod: QW

## 2025-07-18 RX ORDER — DARIFENACIN HYDROBROMIDE 15 MG/1
15 TABLET, EXTENDED RELEASE ORAL
Qty: 30 | Refills: 0 | Status: ACTIVE | COMMUNITY
Start: 2025-07-18 | End: 1900-01-01

## 2025-08-14 ENCOUNTER — RX RENEWAL (OUTPATIENT)
Age: 80
End: 2025-08-14

## 2025-09-11 ENCOUNTER — APPOINTMENT (OUTPATIENT)
Dept: UROLOGY | Facility: CLINIC | Age: 80
End: 2025-09-11